# Patient Record
Sex: FEMALE | Race: WHITE | NOT HISPANIC OR LATINO | URBAN - METROPOLITAN AREA
[De-identification: names, ages, dates, MRNs, and addresses within clinical notes are randomized per-mention and may not be internally consistent; named-entity substitution may affect disease eponyms.]

---

## 2022-12-01 ENCOUNTER — EMERGENCY (EMERGENCY)
Facility: HOSPITAL | Age: 27
LOS: 1 days | Discharge: HOME | End: 2022-12-01
Attending: EMERGENCY MEDICINE | Admitting: EMERGENCY MEDICINE

## 2022-12-01 VITALS
RESPIRATION RATE: 17 BRPM | OXYGEN SATURATION: 98 % | HEART RATE: 73 BPM | SYSTOLIC BLOOD PRESSURE: 132 MMHG | DIASTOLIC BLOOD PRESSURE: 83 MMHG | TEMPERATURE: 97 F

## 2022-12-01 VITALS
HEART RATE: 77 BPM | SYSTOLIC BLOOD PRESSURE: 138 MMHG | OXYGEN SATURATION: 99 % | WEIGHT: 125.66 LBS | TEMPERATURE: 99 F | HEIGHT: 66 IN | RESPIRATION RATE: 17 BRPM | DIASTOLIC BLOOD PRESSURE: 82 MMHG

## 2022-12-01 LAB
ALBUMIN SERPL ELPH-MCNC: 4 G/DL — SIGNIFICANT CHANGE UP (ref 3.4–5)
ALP SERPL-CCNC: 97 U/L — SIGNIFICANT CHANGE UP (ref 40–120)
ALT FLD-CCNC: 21 U/L — SIGNIFICANT CHANGE UP (ref 12–42)
ANION GAP SERPL CALC-SCNC: 10 MMOL/L — SIGNIFICANT CHANGE UP (ref 9–16)
AST SERPL-CCNC: 21 U/L — SIGNIFICANT CHANGE UP (ref 15–37)
BASOPHILS # BLD AUTO: 0.06 K/UL — SIGNIFICANT CHANGE UP (ref 0–0.2)
BASOPHILS NFR BLD AUTO: 0.9 % — SIGNIFICANT CHANGE UP (ref 0–2)
BILIRUB SERPL-MCNC: 0.4 MG/DL — SIGNIFICANT CHANGE UP (ref 0.2–1.2)
BUN SERPL-MCNC: 12 MG/DL — SIGNIFICANT CHANGE UP (ref 7–23)
CALCIUM SERPL-MCNC: 9.1 MG/DL — SIGNIFICANT CHANGE UP (ref 8.5–10.5)
CHLORIDE SERPL-SCNC: 106 MMOL/L — SIGNIFICANT CHANGE UP (ref 96–108)
CO2 SERPL-SCNC: 24 MMOL/L — SIGNIFICANT CHANGE UP (ref 22–31)
CREAT SERPL-MCNC: 0.61 MG/DL — SIGNIFICANT CHANGE UP (ref 0.5–1.3)
EGFR: 124 ML/MIN/1.73M2 — SIGNIFICANT CHANGE UP
EOSINOPHIL # BLD AUTO: 0.1 K/UL — SIGNIFICANT CHANGE UP (ref 0–0.5)
EOSINOPHIL NFR BLD AUTO: 1.5 % — SIGNIFICANT CHANGE UP (ref 0–6)
FLUAV AG NPH QL: SIGNIFICANT CHANGE UP
FLUBV AG NPH QL: SIGNIFICANT CHANGE UP
GLUCOSE SERPL-MCNC: 102 MG/DL — HIGH (ref 70–99)
HCG SERPL-ACNC: 1 MIU/ML — SIGNIFICANT CHANGE UP
HCT VFR BLD CALC: 40.2 % — SIGNIFICANT CHANGE UP (ref 34.5–45)
HGB BLD-MCNC: 12.8 G/DL — SIGNIFICANT CHANGE UP (ref 11.5–15.5)
IMM GRANULOCYTES NFR BLD AUTO: 0 % — SIGNIFICANT CHANGE UP (ref 0–0.9)
LIDOCAIN IGE QN: 107 U/L — SIGNIFICANT CHANGE UP (ref 73–393)
LYMPHOCYTES # BLD AUTO: 2.52 K/UL — SIGNIFICANT CHANGE UP (ref 1–3.3)
LYMPHOCYTES # BLD AUTO: 37.1 % — SIGNIFICANT CHANGE UP (ref 13–44)
MCHC RBC-ENTMCNC: 27.5 PG — SIGNIFICANT CHANGE UP (ref 27–34)
MCHC RBC-ENTMCNC: 31.8 GM/DL — LOW (ref 32–36)
MCV RBC AUTO: 86.5 FL — SIGNIFICANT CHANGE UP (ref 80–100)
MONOCYTES # BLD AUTO: 0.67 K/UL — SIGNIFICANT CHANGE UP (ref 0–0.9)
MONOCYTES NFR BLD AUTO: 9.9 % — SIGNIFICANT CHANGE UP (ref 2–14)
NEUTROPHILS # BLD AUTO: 3.44 K/UL — SIGNIFICANT CHANGE UP (ref 1.8–7.4)
NEUTROPHILS NFR BLD AUTO: 50.6 % — SIGNIFICANT CHANGE UP (ref 43–77)
NRBC # BLD: 0 /100 WBCS — SIGNIFICANT CHANGE UP (ref 0–0)
PLATELET # BLD AUTO: 299 K/UL — SIGNIFICANT CHANGE UP (ref 150–400)
POTASSIUM SERPL-MCNC: 4.3 MMOL/L — SIGNIFICANT CHANGE UP (ref 3.5–5.3)
POTASSIUM SERPL-SCNC: 4.3 MMOL/L — SIGNIFICANT CHANGE UP (ref 3.5–5.3)
PROT SERPL-MCNC: 7.8 G/DL — SIGNIFICANT CHANGE UP (ref 6.4–8.2)
RBC # BLD: 4.65 M/UL — SIGNIFICANT CHANGE UP (ref 3.8–5.2)
RBC # FLD: 12 % — SIGNIFICANT CHANGE UP (ref 10.3–14.5)
RSV RNA NPH QL NAA+NON-PROBE: SIGNIFICANT CHANGE UP
SARS-COV-2 RNA SPEC QL NAA+PROBE: SIGNIFICANT CHANGE UP
SODIUM SERPL-SCNC: 140 MMOL/L — SIGNIFICANT CHANGE UP (ref 132–145)
WBC # BLD: 6.79 K/UL — SIGNIFICANT CHANGE UP (ref 3.8–10.5)
WBC # FLD AUTO: 6.79 K/UL — SIGNIFICANT CHANGE UP (ref 3.8–10.5)

## 2022-12-01 PROCEDURE — 76856 US EXAM PELVIC COMPLETE: CPT | Mod: 26

## 2022-12-01 PROCEDURE — 99284 EMERGENCY DEPT VISIT MOD MDM: CPT

## 2022-12-01 PROCEDURE — 76830 TRANSVAGINAL US NON-OB: CPT | Mod: 26

## 2022-12-01 RX ORDER — SODIUM CHLORIDE 9 MG/ML
1000 INJECTION INTRAMUSCULAR; INTRAVENOUS; SUBCUTANEOUS ONCE
Refills: 0 | Status: COMPLETED | OUTPATIENT
Start: 2022-12-01 | End: 2022-12-01

## 2022-12-01 RX ADMIN — SODIUM CHLORIDE 1000 MILLILITER(S): 9 INJECTION INTRAMUSCULAR; INTRAVENOUS; SUBCUTANEOUS at 19:07

## 2022-12-01 NOTE — ED PROVIDER NOTE - CARE PROVIDER_API CALL
Moo William  OBSTETRICS AND GYNECOLOGY  [address] [place] [id number]  Phone: [phone]  Fax: [phone]  Follow Up Time:

## 2022-12-01 NOTE — ED PROVIDER NOTE - CLINICAL SUMMARY MEDICAL DECISION MAKING FREE TEXT BOX
28 y/o F, denies PMHx, never been pregnant, presents to ED c/o vaginal bleeding with cramping x3 days. Exam was unremarkable. Will obtain blood work, pregnancy test, US pelvis, US transvaginal, and reassess.

## 2022-12-01 NOTE — ED PROVIDER NOTE - PATIENT PORTAL LINK FT
You can access the FollowMyHealth Patient Portal offered by [facility] by registering at the following website: [email or web address]. By joining [employer]’s FollowMyHealth portal, you will also be able to view your health information using other applications (apps) compatible with our system.

## 2022-12-01 NOTE — ED ADULT NURSE NOTE - OBJECTIVE STATEMENT
Pt reports her period ended and she stopped bleeding, then 2 days ago she started with cramping and spotting. Today pt with heavy bleeding but no pain. Pt changed pad once today. No dizziness or lightheadedness.

## 2022-12-01 NOTE — ED PROVIDER NOTE - OBJECTIVE STATEMENT
28 y/o F, denies PMHx, never been pregnant, presents to ED c/o vaginal bleeding with cramping x3 days. Also with fatigue. LMP 11/19 through 11/22. Pt states that the bleeding and cramping feel similar to her typical period. She denies any possibility of pregnancy. She has taken advil with some relief. Pt recently moved to [place] and does not have a PCP or gynecologist yet. She admits to alcohol use 3x per week but denies drug use or smoking. Denies nausea/vomiting and dizziness.

## 2022-12-01 NOTE — ED ADULT NURSE REASSESSMENT NOTE - NS ED NURSE REASSESS COMMENT FT1
Patient assessed with MD at bedside for gyn exam, made aware of imaging results and the need for specialist f/u, verbalized understanding. Ambulated well without assistance.

## 2022-12-01 NOTE — ED PROVIDER NOTE - NSFOLLOWUPINSTRUCTIONS_ED_ALL_ED_FT
Thank you for letting us take care of you today.    Return to the ER if your symptoms worsen, or if any problems.    Call the GYN numbers provided to make a follow up appointment.    Follow these instructions as well:      Abnormal Uterine Bleeding    A female body showing the reproductive organs, with a close-up view of the uterus and vagina.   Abnormal uterine bleeding is unusual bleeding from the uterus. It includes bleeding after sex, or bleeding or spotting between menstrual periods. It may also include bleeding that is heavier than normal, menstrual periods that last longer than usual, or bleeding that occurs after menopause.    Abnormal uterine bleeding can affect teenagers, women in their reproductive years, pregnant women, and women who have reached menopause. Common causes of abnormal uterine bleeding include:  •Pregnancy.      •Abnormal growths within the lining of the uterus (polyps).      •Benign tumors or growths in the uterus (fibroids). These are not cancer.      •Infection.      •Cancer.      •Too much or too little of some hormones in the body (hormonal imbalances).      Any type of abnormal bleeding should be checked by a health care provider. Many cases are minor and simple to treat, but others may be more serious. Treatment will depend on the cause of the bleeding and how severe it is.      Follow these instructions at home:    Medicines     •Take over-the-counter and prescription medicines only as told by your health care provider.    •Ask your health care provider about:  •Taking medicines such as aspirin and ibuprofen. These medicines can thin your blood. Do not take these medicines unless your health care provider tells you to take them.      •Taking over-the-counter medicines, vitamins, herbs, and supplements.        •If you were prescribed iron pills, take them as told by your health care provider. Iron pills help to replace iron that your body loses because of this condition.      Managing constipation     In cases of severe bleeding, you may be asked to increase your iron intake to treat anemia. Doing this may cause constipation. To prevent or treat constipation, you may need to:  •Drink enough fluid to keep your urine pale yellow.      •Take over-the-counter or prescription medicines.      •Eat foods that are high in fiber, such as beans, whole grains, and fresh fruits and vegetables.      •Limit foods that are high in fat and processed sugars, such as fried or sweet foods.      Activity    Alter your activity to decrease bleeding if you need to change your sanitary pad more than one time every 2 hours:  •Lie in bed with your feet raised (elevated).      •Place a cold pack on your lower abdomen.      •Rest as much as possible until the bleeding stops or slows down.      General instructions    •Do not use tampons, douche, or have sex until your health care provider says these things are okay.      •Change your sanitary pads often.      •Get regular exams. These include pelvic exams and cervical cancer screenings.      •It is up to you to get the results of any tests that are done. Ask your health care provider, or the department that is doing the tests, when your results will be ready.    •Monitor your condition for any changes. For 2 months, write down:  •When your menstrual period starts.      •When your menstrual period ends.      •When any abnormal vaginal bleeding occurs.      •What problems you notice.        •Keep all follow-up visits. This is important.        Contact a health care provider if:    •You have bleeding that lasts for more than one week.      •You feel dizzy at times.      •You feel nauseous or you vomit.      •You feel light-headed or weak.      •You notice any other changes that show that your condition is getting worse.        Get help right away if:    •You faint.      •You have bleeding that soaks through a sanitary pad every hour.      •You have pain in the abdomen.      •You have a fever or chills.      •You become sweaty or weak.      •You pass large blood clots from your vagina.      These symptoms may represent a serious problem that is an emergency. Do not wait to see if the symptoms will go away. Get medical help right away. Call your local emergency services (911 in the [place] Do not drive yourself to the hospital.       Summary    •Abnormal uterine bleeding is unusual bleeding from the uterus.      •Any type of abnormal bleeding should be checked by a health care provider. Many cases are minor and simple to treat, but others may be more serious.      •Treatment will depend on the cause of the bleeding and how severe it is.      •Get help right away if you faint, you have bleeding that soaks through a sanitary pad every hour, or you pass large blood clots from your vagina.      This information is not intended to replace advice given to you by your health care provider. Make sure you discuss any questions you have with your health care provider.

## 2022-12-01 NOTE — ED PROVIDER NOTE - CARE PLAN
[facility]  [facility]  [address] [place] [id number]  Phone: [phone]  Fax:   Follow Up Time: 1-3 Days     1 Principal Discharge DX:	Dysfunctional uterine bleeding

## 2022-12-05 DIAGNOSIS — N93.9 ABNORMAL UTERINE AND VAGINAL BLEEDING, UNSPECIFIED: ICD-10-CM

## 2022-12-05 DIAGNOSIS — N93.8 OTHER SPECIFIED ABNORMAL UTERINE AND VAGINAL BLEEDING: ICD-10-CM

## 2022-12-05 DIAGNOSIS — Z20.822 CONTACT WITH AND (SUSPECTED) EXPOSURE TO COVID-19: ICD-10-CM
